# Patient Record
Sex: MALE | Race: WHITE | NOT HISPANIC OR LATINO | Employment: UNEMPLOYED | URBAN - METROPOLITAN AREA
[De-identification: names, ages, dates, MRNs, and addresses within clinical notes are randomized per-mention and may not be internally consistent; named-entity substitution may affect disease eponyms.]

---

## 2019-09-26 ENCOUNTER — OFFICE VISIT (OUTPATIENT)
Dept: PAIN MEDICINE | Facility: CLINIC | Age: 59
End: 2019-09-26
Payer: COMMERCIAL

## 2019-09-26 VITALS
WEIGHT: 160.25 LBS | HEIGHT: 68 IN | HEART RATE: 82 BPM | SYSTOLIC BLOOD PRESSURE: 129 MMHG | DIASTOLIC BLOOD PRESSURE: 83 MMHG | BODY MASS INDEX: 24.29 KG/M2

## 2019-09-26 DIAGNOSIS — M25.561 CHRONIC PAIN OF BOTH KNEES: ICD-10-CM

## 2019-09-26 DIAGNOSIS — M50.30 DDD (DEGENERATIVE DISC DISEASE), CERVICAL: ICD-10-CM

## 2019-09-26 DIAGNOSIS — M79.642 PAIN IN BOTH HANDS: ICD-10-CM

## 2019-09-26 DIAGNOSIS — M51.36 DDD (DEGENERATIVE DISC DISEASE), LUMBAR: Primary | ICD-10-CM

## 2019-09-26 DIAGNOSIS — M47.812 OSTEOARTHRITIS OF CERVICAL SPINE, UNSPECIFIED SPINAL OSTEOARTHRITIS COMPLICATION STATUS: ICD-10-CM

## 2019-09-26 DIAGNOSIS — G89.29 CHRONIC PAIN OF BOTH KNEES: ICD-10-CM

## 2019-09-26 DIAGNOSIS — M47.816 LUMBAR SPONDYLOSIS: ICD-10-CM

## 2019-09-26 DIAGNOSIS — M79.641 PAIN IN BOTH HANDS: ICD-10-CM

## 2019-09-26 DIAGNOSIS — M25.562 CHRONIC PAIN OF BOTH KNEES: ICD-10-CM

## 2019-09-26 PROBLEM — M51.369 DDD (DEGENERATIVE DISC DISEASE), LUMBAR: Status: ACTIVE | Noted: 2019-09-26

## 2019-09-26 PROCEDURE — 99244 PR OFFICE CONSULTATION,LEVEL IV: ICD-10-PCS | Mod: S$GLB,,, | Performed by: PAIN MEDICINE

## 2019-09-26 PROCEDURE — 99244 OFF/OP CNSLTJ NEW/EST MOD 40: CPT | Mod: S$GLB,,, | Performed by: PAIN MEDICINE

## 2019-09-26 PROCEDURE — 99999 PR PBB SHADOW E&M-NEW PATIENT-LVL III: CPT | Mod: PBBFAC,,, | Performed by: PAIN MEDICINE

## 2019-09-26 PROCEDURE — 99999 PR PBB SHADOW E&M-NEW PATIENT-LVL III: ICD-10-PCS | Mod: PBBFAC,,, | Performed by: PAIN MEDICINE

## 2019-09-26 RX ORDER — ASPIRIN 325 MG
50000 TABLET, DELAYED RELEASE (ENTERIC COATED) ORAL
Refills: 1 | COMMUNITY
Start: 2019-08-15

## 2019-09-26 RX ORDER — PRAVASTATIN SODIUM 10 MG/1
TABLET ORAL
COMMUNITY
End: 2019-10-24

## 2019-09-26 RX ORDER — AZELASTINE 1 MG/ML
SPRAY, METERED NASAL
COMMUNITY
End: 2020-02-03

## 2019-09-26 RX ORDER — GUAIFENESIN 1200 MG
TABLET, EXTENDED RELEASE 12 HR ORAL
COMMUNITY

## 2019-09-26 RX ORDER — FLUTICASONE PROPIONATE 50 MCG
SPRAY, SUSPENSION (ML) NASAL
Refills: 1 | COMMUNITY
Start: 2019-08-15 | End: 2021-09-15 | Stop reason: CLARIF

## 2019-09-26 RX ORDER — OMEPRAZOLE 40 MG/1
40 CAPSULE, DELAYED RELEASE ORAL DAILY
Refills: 0 | COMMUNITY
Start: 2019-08-19 | End: 2020-01-30

## 2019-09-26 RX ORDER — LISINOPRIL 2.5 MG/1
TABLET ORAL
COMMUNITY
End: 2020-02-03

## 2019-09-26 RX ORDER — HYDROCODONE BITARTRATE AND ACETAMINOPHEN 5; 325 MG/1; MG/1
TABLET ORAL
COMMUNITY
End: 2021-09-15 | Stop reason: CLARIF

## 2019-09-26 RX ORDER — ZOLPIDEM TARTRATE 5 MG/1
5 TABLET ORAL NIGHTLY
Refills: 1 | COMMUNITY
Start: 2019-08-15 | End: 2021-09-15 | Stop reason: CLARIF

## 2019-09-26 NOTE — PROGRESS NOTES
Subjective:     Patient ID: Nando Krause is a 59 y.o. male    Chief Complaint: Back Pain and Neck Pain      Referred by: Vicki Wilburn MD      HPI:    Initial Encounter (9/26/19):  Nando Krause is a 59 y.o. male who presents today with chronic neck and low back pain. This pain has been present for years.  No specific inciting event or injury noted. Patient has been treated in the past by Dr. Cristofer Barcenas and has undergone multiple interventional procedures for both his neck and low back.  He reports more success in treating his low back.  Patient states he has undergone epidurals and radiofrequency ablation of the cervical spine without any meaningful relief.  Patient states he has also been offered neck surgery by an orthopedic surgeon.  Today patient localizes his neck pain to the bilateral lower cervical regions.  The pain radiates the bilateral upper extremities with some associated numbness.  Patient denies any focal weakness or bowel bladder dysfunction.  The pain is constant worse with activity..   This pain is described in detail below.    Patient also reports having multiple peripheral joint pains and requests to see an orthopedic surgeon.    Physical Therapy:  Yes.    Non-pharmacologic Treatment:  Rest helps         · TENS?  No    Pain Medications:         · Currently taking:  Tylenol, Norco    · Has tried in the past:  NSAIDs    · Has not tried:  Muscle relaxants, TCAs, SNRIs, anticonvulsants, topical creams    Blood thinners:  None    Interventional Therapies:   Multiple interventional procedures done by Dr. Cristofer Barcenas.  No further details at this time    Relevant Surgeries:  None    Affecting sleep?  Yes    Affecting daily activities? yes    Depressive symptoms? no          · SI/HI? No    Work status: Disabled    Pain Scores:    Best:       4/10  Worst:     9/10  Usually:   5/10  Today:    4/10    Review of Systems   Constitutional: Negative for activity change, appetite change,  chills, fatigue, fever and unexpected weight change.   HENT: Negative for hearing loss.    Eyes: Negative for visual disturbance.   Respiratory: Negative for chest tightness and shortness of breath.    Cardiovascular: Negative for chest pain.   Gastrointestinal: Negative for abdominal pain, constipation, diarrhea, nausea and vomiting.   Genitourinary: Negative for difficulty urinating.   Musculoskeletal: Positive for arthralgias, back pain, gait problem, joint swelling, myalgias, neck pain and neck stiffness.   Skin: Negative for rash.   Neurological: Positive for numbness. Negative for dizziness, weakness, light-headedness and headaches.   Psychiatric/Behavioral: Positive for sleep disturbance. Negative for hallucinations and suicidal ideas. The patient is not nervous/anxious.        History reviewed. No pertinent past medical history.    History reviewed. No pertinent surgical history.    Social History     Socioeconomic History    Marital status:      Spouse name: Not on file    Number of children: Not on file    Years of education: Not on file    Highest education level: Not on file   Occupational History    Not on file   Social Needs    Financial resource strain: Not on file    Food insecurity:     Worry: Not on file     Inability: Not on file    Transportation needs:     Medical: Not on file     Non-medical: Not on file   Tobacco Use    Smoking status: Current Every Day Smoker   Substance and Sexual Activity    Alcohol use: Not on file    Drug use: Not on file    Sexual activity: Not on file   Lifestyle    Physical activity:     Days per week: Not on file     Minutes per session: Not on file    Stress: Not on file   Relationships    Social connections:     Talks on phone: Not on file     Gets together: Not on file     Attends Yarsanism service: Not on file     Active member of club or organization: Not on file     Attends meetings of clubs or organizations: Not on file     Relationship  "status: Not on file   Other Topics Concern    Not on file   Social History Narrative    Not on file       Review of patient's allergies indicates:  No Known Allergies    Current Outpatient Medications on File Prior to Visit   Medication Sig Dispense Refill    acetaminophen 325 mg Cap acetaminophen 325 mg capsule   Take 2 capsules every 4-6 hours by oral route.      azelastine (ASTELIN) 137 mcg (0.1 %) nasal spray azelastine 137 mcg (0.1 %) nasal spray aerosol   Spray 2 sprays twice a day by intranasal route.      cholecalciferol, vitamin D3, 50,000 unit capsule Take 50,000 Units by mouth every 7 days.  1    fluticasone propionate (FLONASE) 50 mcg/actuation nasal spray SPRAY 1 SPRAY INTO EACH NOSTRIL TWICE A DAY  1    HYDROcodone-acetaminophen (NORCO) 5-325 mg per tablet hydrocodone 5 mg-acetaminophen 325 mg tablet      lisinopril (PRINIVIL,ZESTRIL) 2.5 MG tablet lisinopril 2.5 mg tablet      omeprazole (PRILOSEC) 40 MG capsule Take 40 mg by mouth once daily.  0    pravastatin (PRAVACHOL) 10 MG tablet pravastatin 10 mg tablet      ranitidine (ZANTAC) 300 MG tablet Take 300 mg by mouth once daily.  1    zolpidem (AMBIEN) 5 MG Tab Take 5 mg by mouth every evening.  1     No current facility-administered medications on file prior to visit.        Objective:      /83 (BP Location: Left arm, Patient Position: Sitting, BP Method: Medium (Automatic))   Pulse 82   Ht 5' 8" (1.727 m)   Wt 72.7 kg (160 lb 4.4 oz)   BMI 24.37 kg/m²     Exam:  GEN:  Well developed, well nourished.  No acute distress.  Normal pain behavior.  HEENT:  No trauma.  Mucous membranes moist.  Nares patent bilaterally.  PSYCH: Normal affect. Thought content appropriate.  CHEST:  Breathing symmetric.  No audible wheezing.  ABD: Soft, non-distended.  SKIN:  Warm, pink, dry.  No rash on exposed areas.    EXT:  No cyanosis, clubbing, or edema.  No color change or changes in nail or hair growth.  NEURO/MUSCULOSKELETAL:  Fully alert, " oriented, and appropriate. Speech normal delmy. No cranial nerve deficits.   Gait:   normal.  5/5 motor strength throughout upper extremities.   Sensory:   no  sensory deficit in the upper extremities.   Reflexes:   2 + and symmetric throughout.   absent  Abraham's bilaterally.  C-Spine:   full  ROM with pain on  extension more than flexion.  positive  facet loading bilaterally.   negative  Spurling's bilaterally.    Positive  TTP over bilateral lower cervical paraspinal muscles          Imaging:  Outside cervical MRI from 4/26/19 reviewed today:    Disc protrusion at the C5-6 level causing severe narrowing of the right neural foramen and mild left foraminal stenosis  Moderate narrowing of the bilateral neural foramen at the C6-7 level  Multilevel facet arthropathy    Assessment:       Encounter Diagnoses   Name Primary?    DDD (degenerative disc disease), lumbar Yes    DDD (degenerative disc disease), cervical     Osteoarthritis of cervical spine, unspecified spinal osteoarthritis complication status     Lumbar spondylosis     Pain in both hands     Chronic pain of both knees          Plan:       Nando was seen today for back pain and neck pain.    Diagnoses and all orders for this visit:    DDD (degenerative disc disease), lumbar    DDD (degenerative disc disease), cervical    Osteoarthritis of cervical spine, unspecified spinal osteoarthritis complication status    Lumbar spondylosis    Pain in both hands  -     Ambulatory referral/consult to Orthopedics; Future    Chronic pain of both knees  -     Ambulatory referral/consult to Orthopedics; Future        Nando Krause is a 59 y.o. male with chronic neck and low back pain. Neck pain appears to be multifactorial in etiology.  Likely has components of both facet mediated pain as well as possible radiculopathy.  Also with chronic low back pain that is returning following interventional procedures done by another pain medicine physician.  Patient  also has multiple other peripheral joint pains..    1.  Pertinent imaging studies reviewed by me. Imaging results were discussed with patient.  2.  Release of information from Dr. Cristofer Barcenas for previously performed procedures.    3.  Refer to Orthopedics be evaluated for a source joint pains.  4.  Return to clinic in 2 weeks.  At that time we will outside records consider any appropriate interventional procedures versus physical therapy.

## 2019-09-26 NOTE — LETTER
September 26, 2019      Vicki Wilburn MD  [address]  [facility], [place] [address]           [facility]  [address], [facility] [address]  [facility] [address]  Phone: [phone]  Fax: [phone]          Patient: Nando Krause   MR Number: [id number]   YOB: 1960   Date of Visit: 9/26/2019       Dear Dr. Vicki Wilburn:    Thank you for referring Nando Krause to me for evaluation. Attached you will find relevant portions of my assessment and plan of care.    If you have questions, please do not hesitate to call me. I look forward to following Nando Krause along with you.    Sincerely,    Blair Burrell Jr., MD    Enclosure  CC:  No Recipients    If you would like to receive this communication electronically, please contact [email or web address] or [phone] to request more information on [employer] Link access.    For providers and/or their staff who would like to refer a patient to [facility], please contact us through our one-stop-shop provider referral line, [facility], at [phone].    If you feel you have received this communication in error or would no longer like to receive these types of communications, please e-mail [email or web address]

## 2019-10-24 ENCOUNTER — OFFICE VISIT (OUTPATIENT)
Dept: PAIN MEDICINE | Facility: CLINIC | Age: 59
End: 2019-10-24
Payer: COMMERCIAL

## 2019-10-24 VITALS
BODY MASS INDEX: 24.56 KG/M2 | DIASTOLIC BLOOD PRESSURE: 83 MMHG | SYSTOLIC BLOOD PRESSURE: 126 MMHG | HEART RATE: 74 BPM | HEIGHT: 68 IN | WEIGHT: 162.06 LBS

## 2019-10-24 DIAGNOSIS — M50.30 DDD (DEGENERATIVE DISC DISEASE), CERVICAL: ICD-10-CM

## 2019-10-24 DIAGNOSIS — M48.061 SPINAL STENOSIS OF LUMBAR REGION, UNSPECIFIED WHETHER NEUROGENIC CLAUDICATION PRESENT: ICD-10-CM

## 2019-10-24 DIAGNOSIS — M47.816 LUMBAR SPONDYLOSIS: ICD-10-CM

## 2019-10-24 DIAGNOSIS — M51.36 DDD (DEGENERATIVE DISC DISEASE), LUMBAR: ICD-10-CM

## 2019-10-24 DIAGNOSIS — M47.812 OSTEOARTHRITIS OF CERVICAL SPINE, UNSPECIFIED SPINAL OSTEOARTHRITIS COMPLICATION STATUS: Primary | ICD-10-CM

## 2019-10-24 PROCEDURE — 3008F PR BODY MASS INDEX (BMI) DOCUMENTED: ICD-10-PCS | Mod: CPTII,S$GLB,, | Performed by: PAIN MEDICINE

## 2019-10-24 PROCEDURE — 3008F BODY MASS INDEX DOCD: CPT | Mod: CPTII,S$GLB,, | Performed by: PAIN MEDICINE

## 2019-10-24 PROCEDURE — 99214 OFFICE O/P EST MOD 30 MIN: CPT | Mod: S$GLB,,, | Performed by: PAIN MEDICINE

## 2019-10-24 PROCEDURE — 99214 PR OFFICE/OUTPT VISIT, EST, LEVL IV, 30-39 MIN: ICD-10-PCS | Mod: S$GLB,,, | Performed by: PAIN MEDICINE

## 2019-10-24 PROCEDURE — 99999 PR PBB SHADOW E&M-EST. PATIENT-LVL III: CPT | Mod: PBBFAC,,, | Performed by: PAIN MEDICINE

## 2019-10-24 PROCEDURE — 99999 PR PBB SHADOW E&M-EST. PATIENT-LVL III: ICD-10-PCS | Mod: PBBFAC,,, | Performed by: PAIN MEDICINE

## 2019-10-24 RX ORDER — PRAVASTATIN SODIUM 40 MG/1
40 TABLET ORAL DAILY
Refills: 1 | COMMUNITY
Start: 2019-10-15 | End: 2021-09-15 | Stop reason: CLARIF

## 2019-10-24 NOTE — PROGRESS NOTES
Subjective:     Patient ID: Nando Krause is a 59 y.o. male    Chief Complaint: Back Pain (2 week f/u)      Referred by: No ref. provider found      HPI:    Interval History (10/24/19):  He returns today for follow up.  He reports that his pain is unchanged in quality location since last encounter.  He denies any new or worsening symptoms.  At his last visit, we planned to request records from his previous pain medicine physician to review previously performed interventional pain procedures.  No records have yet been received.  Patient states that he has undergone multiple epidural steroid injections for his neck as well as multiple radiofrequency ablation for his neck with little to no relief.  He states that he had 1 epidural done in his low back that provided 80% relief for roughly 7 months.  He is interested in attempting to treat his low back at this time.      Initial Encounter (9/26/19):  Nando Krause is a 59 y.o. male who presents today with chronic neck and low back pain. This pain has been present for years.  No specific inciting event or injury noted. Patient has been treated in the past by Dr. Cristofer Barecnas and has undergone multiple interventional procedures for both his neck and low back.  He reports more success in treating his low back.  Patient states he has undergone epidurals and radiofrequency ablation of the cervical spine without any meaningful relief.  Patient states he has also been offered neck surgery by an orthopedic surgeon.  Today patient localizes his neck pain to the bilateral lower cervical regions.  The pain radiates the bilateral upper extremities with some associated numbness.  Patient denies any focal weakness or bowel bladder dysfunction.  The pain is constant worse with activity..   This pain is described in detail below.    Patient also reports having multiple peripheral joint pains and requests to see an orthopedic surgeon.    Physical Therapy:   Yes.    Non-pharmacologic Treatment:  Rest helps         · TENS?  No    Pain Medications:         · Currently taking:  Tylenol, Norco    · Has tried in the past:  NSAIDs    · Has not tried:  Muscle relaxants, TCAs, SNRIs, anticonvulsants, topical creams    Blood thinners:  None    Interventional Therapies:   Multiple interventional procedures done by Dr. Cristofer Barcenas.  No further details at this time    Relevant Surgeries:  None    Affecting sleep?  Yes    Affecting daily activities? yes    Depressive symptoms? no          · SI/HI? No    Work status: Disabled    Pain Scores:    Best:       4/10  Worst:     9/10  Usually:   5/10  Today:    4/10    Review of Systems   Constitutional: Negative for activity change, appetite change, chills, fatigue, fever and unexpected weight change.   HENT: Negative for hearing loss.    Eyes: Negative for visual disturbance.   Respiratory: Negative for chest tightness and shortness of breath.    Cardiovascular: Negative for chest pain.   Gastrointestinal: Negative for abdominal pain, constipation, diarrhea, nausea and vomiting.   Genitourinary: Negative for difficulty urinating.   Musculoskeletal: Positive for arthralgias, back pain, gait problem, joint swelling, myalgias, neck pain and neck stiffness.   Skin: Negative for rash.   Neurological: Positive for weakness and numbness. Negative for dizziness, light-headedness and headaches.   Psychiatric/Behavioral: Positive for sleep disturbance. Negative for hallucinations and suicidal ideas. The patient is not nervous/anxious.        History reviewed. No pertinent past medical history.    History reviewed. No pertinent surgical history.    Social History     Socioeconomic History    Marital status:      Spouse name: Not on file    Number of children: Not on file    Years of education: Not on file    Highest education level: Not on file   Occupational History    Not on file   Social Needs    Financial resource strain: Not on  file    Food insecurity:     Worry: Not on file     Inability: Not on file    Transportation needs:     Medical: Not on file     Non-medical: Not on file   Tobacco Use    Smoking status: Current Every Day Smoker   Substance and Sexual Activity    Alcohol use: Not on file    Drug use: Not on file    Sexual activity: Not on file   Lifestyle    Physical activity:     Days per week: Not on file     Minutes per session: Not on file    Stress: Not on file   Relationships    Social connections:     Talks on phone: Not on file     Gets together: Not on file     Attends [employer] service: Not on file     Active member of club or organization: Not on file     Attends meetings of clubs or organizations: Not on file     Relationship status: Not on file   Other Topics Concern    Not on file   Social History Narrative    Not on file       Review of patient's allergies indicates:  No Known Allergies    Current Outpatient Medications on File Prior to Visit   Medication Sig Dispense Refill    acetaminophen 325 mg Cap acetaminophen 325 mg capsule   Take 2 capsules every 4-6 hours by oral route.      cholecalciferol, vitamin D3, 50,000 unit capsule Take 50,000 Units by mouth every 7 days.  1    fluticasone propionate (FLONASE) 50 mcg/actuation nasal spray SPRAY 1 SPRAY INTO EACH NOSTRIL TWICE A DAY  1    HYDROcodone-acetaminophen (NORCO) 5-325 mg per tablet hydrocodone 5 mg-acetaminophen 325 mg tablet      omeprazole (PRILOSEC) 40 MG capsule Take 40 mg by mouth once daily.  0    pravastatin (PRAVACHOL) 40 MG tablet Take 40 mg by mouth once daily.  1    ranitidine (ZANTAC) 300 MG tablet Take 300 mg by mouth once daily.  1    zolpidem (AMBIEN) 5 MG Tab Take 5 mg by mouth every evening.  1    azelastine (ASTELIN) 137 mcg (0.1 %) nasal spray azelastine 137 mcg (0.1 %) nasal spray aerosol   Spray 2 sprays twice a day by intranasal route.      lisinopril (PRINIVIL,ZESTRIL) 2.5 MG tablet lisinopril 2.5 mg tablet       "[DISCONTINUED] pravastatin (PRAVACHOL) 10 MG tablet pravastatin 10 mg tablet       No current facility-administered medications on file prior to visit.        Objective:      /83 (BP Location: Left arm, Patient Position: Sitting, BP Method: Medium (Automatic))   Pulse 74   Ht 5' 8" (1.727 m)   Wt 73.5 kg (162 lb 0.6 oz)   BMI 24.64 kg/m²     Exam:  GEN:  Well developed, well nourished.  No acute distress.  Normal pain behavior.  HEENT:  No trauma.  Mucous membranes moist.  Nares patent bilaterally.  PSYCH: Normal affect. Thought content appropriate.  CHEST:  Breathing symmetric.  No audible wheezing.  ABD: Soft, non-distended.  SKIN:  Warm, pink, dry.  No rash on exposed areas.    EXT:  No cyanosis, clubbing, or edema.  No color change or changes in nail or hair growth.  NEURO/MUSCULOSKELETAL:  Fully alert, oriented, and appropriate. Speech normal delmy. No cranial nerve deficits.   Gait:  Normal.  No trendelenburg sign bilaterally.   Motor Strength: 5/5 motor strength throughout lower extremities.   Sensory:  No sensory deficit in the lower extremities.   Reflexes:  2 + and symmetric throughout.  Downgoing Babinski's bilaterally.  No clonus or spasticity.  L-Spine:  Full ROM with pain on extension. Positive pain with axial/facet loading bilaterally.  Negative SLR bilaterally.    No TTP over lumbar paraspinals, bilateral SI joints, hips, piriformis muscles, or GTB.            Imaging:    Patient states that he underwent lumbar MRI within the past 2 years.  We do not have record of this MRI at this time.    Outside cervical MRI from 4/26/19 reviewed:    Disc protrusion at the C5-6 level causing severe narrowing of the right neural foramen and mild left foraminal stenosis  Moderate narrowing of the bilateral neural foramen at the C6-7 level  Multilevel facet arthropathy    Assessment:       Encounter Diagnoses   Name Primary?    Osteoarthritis of cervical spine, unspecified spinal osteoarthritis " complication status Yes    DDD (degenerative disc disease), cervical     DDD (degenerative disc disease), lumbar     Lumbar spondylosis     Spinal stenosis of lumbar region, unspecified whether neurogenic claudication present          Plan:       Nando was seen today for back pain.    Diagnoses and all orders for this visit:    Osteoarthritis of cervical spine, unspecified spinal osteoarthritis complication status    DDD (degenerative disc disease), cervical    DDD (degenerative disc disease), lumbar    Lumbar spondylosis    Spinal stenosis of lumbar region, unspecified whether neurogenic claudication present        Nando Krause is a 59 y.o. male with chronic neck and low back pain. As far as the patient's neck pain is concerned, it sounds as though all reasonable interventional procedures have been attempted without meaningful relief.  I am somewhat doubtful that I have any further options to treat this problem, but I will review outside records when available to make sure.  Patient's low back pain appears to be multifactorial.  He did get 80% relief for over 7 months from previous epidural done.  Also has indications of facet joint pain examination today.    1.  Re-request records from previous pain medicine physician to review previously performed interventional procedures.  2.  Patient was instructed to obtain a copy of his lumbar MRI disc and bring it to my clinic so that I can review the images prior to any interventional procedures.  3.  Schedule for lumbar epidural steroid injection. I plan to repeat the epidural done by his previous pain medicine physician given the degree and duration of relief that he received.  I will need to review outside records to see exactly which Level and approach were used.  4.  Return to clinic 2 weeks after procedure to discuss results.  May consider lumbar medial branch blocks/radiofrequency ablation in the future if appropriate.

## 2019-11-06 ENCOUNTER — TELEPHONE (OUTPATIENT)
Dept: PAIN MEDICINE | Facility: CLINIC | Age: 59
End: 2019-11-06

## 2019-11-06 NOTE — TELEPHONE ENCOUNTER
----- Message from Ghada Scott sent at 11/6/2019  9:02 AM CST -----  Contact: pt  Reason: Pt returning call from [facility]    Communication: [phone]

## 2019-11-21 ENCOUNTER — OFFICE VISIT (OUTPATIENT)
Dept: ORTHOPEDICS | Facility: CLINIC | Age: 59
End: 2019-11-21
Payer: COMMERCIAL

## 2019-11-21 ENCOUNTER — TELEPHONE (OUTPATIENT)
Dept: ORTHOPEDICS | Facility: CLINIC | Age: 59
End: 2019-11-21

## 2019-11-21 VITALS
HEART RATE: 69 BPM | DIASTOLIC BLOOD PRESSURE: 93 MMHG | HEIGHT: 68 IN | BODY MASS INDEX: 24.39 KG/M2 | SYSTOLIC BLOOD PRESSURE: 134 MMHG | WEIGHT: 160.94 LBS

## 2019-11-21 DIAGNOSIS — M79.642 PAIN IN BOTH HANDS: ICD-10-CM

## 2019-11-21 DIAGNOSIS — M79.641 PAIN IN BOTH HANDS: ICD-10-CM

## 2019-11-21 DIAGNOSIS — R52 PAIN: ICD-10-CM

## 2019-11-21 DIAGNOSIS — M25.561 CHRONIC PAIN OF BOTH KNEES: ICD-10-CM

## 2019-11-21 DIAGNOSIS — M54.50 LOW BACK PAIN, UNSPECIFIED BACK PAIN LATERALITY, UNSPECIFIED CHRONICITY, UNSPECIFIED WHETHER SCIATICA PRESENT: Primary | ICD-10-CM

## 2019-11-21 DIAGNOSIS — M25.561 PAIN IN BOTH KNEES, UNSPECIFIED CHRONICITY: ICD-10-CM

## 2019-11-21 DIAGNOSIS — G89.29 CHRONIC PAIN OF BOTH KNEES: ICD-10-CM

## 2019-11-21 DIAGNOSIS — M79.641 PAIN OF RIGHT HAND: Primary | ICD-10-CM

## 2019-11-21 DIAGNOSIS — M25.562 PAIN IN BOTH KNEES, UNSPECIFIED CHRONICITY: ICD-10-CM

## 2019-11-21 DIAGNOSIS — M25.562 CHRONIC PAIN OF BOTH KNEES: ICD-10-CM

## 2019-11-21 PROCEDURE — 99999 PR PBB SHADOW E&M-EST. PATIENT-LVL III: ICD-10-PCS | Mod: PBBFAC,,, | Performed by: ORTHOPAEDIC SURGERY

## 2019-11-21 PROCEDURE — 99999 PR PBB SHADOW E&M-EST. PATIENT-LVL I: CPT | Mod: PBBFAC,,, | Performed by: ORTHOPAEDIC SURGERY

## 2019-11-21 PROCEDURE — 99999 PR PBB SHADOW E&M-EST. PATIENT-LVL III: CPT | Mod: PBBFAC,,, | Performed by: ORTHOPAEDIC SURGERY

## 2019-11-21 PROCEDURE — 99204 OFFICE O/P NEW MOD 45 MIN: CPT | Mod: S$GLB,,, | Performed by: ORTHOPAEDIC SURGERY

## 2019-11-21 PROCEDURE — 99499 UNLISTED E&M SERVICE: CPT | Mod: S$GLB,,, | Performed by: ORTHOPAEDIC SURGERY

## 2019-11-21 PROCEDURE — 3008F BODY MASS INDEX DOCD: CPT | Mod: CPTII,S$GLB,, | Performed by: ORTHOPAEDIC SURGERY

## 2019-11-21 PROCEDURE — 99999 PR PBB SHADOW E&M-EST. PATIENT-LVL I: ICD-10-PCS | Mod: PBBFAC,,, | Performed by: ORTHOPAEDIC SURGERY

## 2019-11-21 PROCEDURE — 99204 PR OFFICE/OUTPT VISIT, NEW, LEVL IV, 45-59 MIN: ICD-10-PCS | Mod: S$GLB,,, | Performed by: ORTHOPAEDIC SURGERY

## 2019-11-21 PROCEDURE — 3008F PR BODY MASS INDEX (BMI) DOCUMENTED: ICD-10-PCS | Mod: CPTII,S$GLB,, | Performed by: ORTHOPAEDIC SURGERY

## 2019-11-21 PROCEDURE — 99499 NO LOS: ICD-10-PCS | Mod: S$GLB,,, | Performed by: ORTHOPAEDIC SURGERY

## 2019-11-21 RX ORDER — TRAMADOL HYDROCHLORIDE 50 MG/1
50 TABLET ORAL NIGHTLY PRN
Qty: 31 TABLET | Refills: 2 | Status: SHIPPED | OUTPATIENT
Start: 2019-11-21 | End: 2019-12-01

## 2019-11-21 RX ORDER — MELOXICAM 15 MG/1
15 TABLET ORAL DAILY
Qty: 30 TABLET | Refills: 2 | Status: SHIPPED | OUTPATIENT
Start: 2019-11-21 | End: 2020-09-02

## 2019-11-21 RX ORDER — METHYLPREDNISOLONE 4 MG/1
TABLET ORAL
Qty: 1 PACKAGE | Refills: 0 | Status: SHIPPED | OUTPATIENT
Start: 2019-11-21 | End: 2020-01-30 | Stop reason: ALTCHOICE

## 2019-11-21 NOTE — PROGRESS NOTES
Orthopaedic Surgery History and Physical     History of present illness:   Nando Krause is a 59 y.o. male who presents with subjective bilateral knee pain. No mechanical symptoms. No radiculopathy.  Patient has concomitant lumbar pathology from which he reports radiating pain emanating from his lower spine down the posterior aspect of his thighs and down to his feet.    Allergies:   Review of patient's allergies indicates:  No Known Allergies    Past medical history:   No past medical history on file.    Past surgical history:  No past surgical history on file.    Social history:   Reviewed per EPIC history for tobacco or alcohol use     Medications:    Current Outpatient Medications:     cholecalciferol, vitamin D3, 50,000 unit capsule, Take 50,000 Units by mouth every 7 days., Disp: , Rfl: 1    fluticasone propionate (FLONASE) 50 mcg/actuation nasal spray, SPRAY 1 SPRAY INTO EACH NOSTRIL TWICE A DAY, Disp: , Rfl: 1    omeprazole (PRILOSEC) 40 MG capsule, Take 40 mg by mouth once daily., Disp: , Rfl: 0    pravastatin (PRAVACHOL) 40 MG tablet, Take 40 mg by mouth once daily., Disp: , Rfl: 1    zolpidem (AMBIEN) 5 MG Tab, Take 5 mg by mouth every evening., Disp: , Rfl: 1    acetaminophen 325 mg Cap, acetaminophen 325 mg capsule  Take 2 capsules every 4-6 hours by oral route., Disp: , Rfl:     azelastine (ASTELIN) 137 mcg (0.1 %) nasal spray, azelastine 137 mcg (0.1 %) nasal spray aerosol  Spray 2 sprays twice a day by intranasal route., Disp: , Rfl:     HYDROcodone-acetaminophen (NORCO) 5-325 mg per tablet, hydrocodone 5 mg-acetaminophen 325 mg tablet, Disp: , Rfl:     lisinopril (PRINIVIL,ZESTRIL) 2.5 MG tablet, lisinopril 2.5 mg tablet, Disp: , Rfl:     ranitidine (ZANTAC) 300 MG tablet, Take 300 mg by mouth once daily., Disp: , Rfl: 1    Review of systems:  Denies chest pain, palpitations, shortness of breath.   Denies excessive thirst, urination or heat or cold intolerance.   Denies nausea,  "vomiting, melena or hematochezia.    Denies fever, chills, night sweats, weight loss.    Denies dysuria or hematuria.   Denies history of anxiety or depression.   Denies any skin abnormalities or rash.   Denies upper or lower extremity paresthesias or lightheadedness.    Denies cough, shortness of breath or hemoptysis.     Physical Exam:   Vitals:    11/21/19 1119   BP: (!) 134/93   BP Location: Left arm   Patient Position: Sitting   BP Method: Medium (Automatic)   Pulse: 69   Weight: 73 kg (160 lb 15 oz)   Height: 5' 8" (1.727 m)     Awake/alert/oriented x3, No acute distress, Afebrile, Vital signs stable  Normocephalic, Atraumatic  Heart is beating at normal rate  Good inspiratory effort with unlaboured breathing  Abdomen soft/nondistended/nontender    Bilateral lower extremity  Motor intact L2-S1  Sensation intact L2-S2  2+ popliteal/dorsalis pedis/posterior tibial pulses  <2s CR refill to digits  0-130 degrees range of motion bilateral knees      Imaging:  Radiographs of the bilateral knees demonstrates minimal tricompartmental degeneration with no deformity.    Assessment:   59 y.o. male with lumbar radiculopathy    Plan:   Continue follow-up with Pain Management  Medrol Dosepak  NSAIDs  Tramadol  Activity as tolerated  Return to clinic p.grecia.    Nando Rob MD  [facility]  "

## 2019-11-21 NOTE — LETTER
December 5, 2019      Blair Burrell Jr., MD  [address]  [address]  [place] [address]           [facility]  Orthopedics  [address], [facility] [address]  [facility] [place] [address]  Phone: [phone]  Fax: [phone]          Patient: Nando Krause   MR Number: [id number]   YOB: 1960   Date of Visit: 11/21/2019       Dear Dr. Blair Burrell Jr.:    Thank you for referring Nando Krause to me for evaluation. Attached you will find relevant portions of my assessment and plan of care.    If you have questions, please do not hesitate to call me. I look forward to following Nando Krause along with you.    Sincerely,    Harini Castillo  CC:  No Recipients    If you would like to receive this communication electronically, please contact [email or web address] or [phone] to request more information on [employer] Link access.    For providers and/or their staff who would like to refer a patient to [facility], please contact us through our one-stop-shop provider referral line, [facility], at [phone].    If you feel you have received this communication in error or would no longer like to receive these types of communications, please e-mail [email or web address]

## 2019-12-19 PROBLEM — R52 PAIN: Status: ACTIVE | Noted: 2019-12-19

## 2020-01-28 ENCOUNTER — TELEPHONE (OUTPATIENT)
Dept: PAIN MEDICINE | Facility: CLINIC | Age: 60
End: 2020-01-28

## 2020-01-28 NOTE — TELEPHONE ENCOUNTER
----- Message from Rizwana Degroot sent at 1/28/2020 11:19 AM CST -----  Contact: PT [phone]  Pt is trying to speak with Lazarus please call back

## 2020-01-28 NOTE — TELEPHONE ENCOUNTER
Spoke with patient about making an appointment to come to the clinic to discuss scheduling future procedures. Appointment has been set for 1/30/2020. No further issues discussed.

## 2020-01-28 NOTE — TELEPHONE ENCOUNTER
Left callback message. Discuss making an appointment with Dr Burrell to review plans for a series of procedures (MBB and RFA).

## 2020-01-30 ENCOUNTER — OFFICE VISIT (OUTPATIENT)
Dept: PAIN MEDICINE | Facility: CLINIC | Age: 60
End: 2020-01-30
Payer: COMMERCIAL

## 2020-01-30 VITALS
HEIGHT: 68 IN | WEIGHT: 165.88 LBS | SYSTOLIC BLOOD PRESSURE: 127 MMHG | HEART RATE: 77 BPM | DIASTOLIC BLOOD PRESSURE: 81 MMHG | BODY MASS INDEX: 25.14 KG/M2

## 2020-01-30 DIAGNOSIS — M47.816 LUMBAR SPONDYLOSIS: ICD-10-CM

## 2020-01-30 DIAGNOSIS — M54.16 LUMBAR RADICULOPATHY: ICD-10-CM

## 2020-01-30 DIAGNOSIS — M51.36 DDD (DEGENERATIVE DISC DISEASE), LUMBAR: Primary | ICD-10-CM

## 2020-01-30 PROCEDURE — 99499 NO LOS: ICD-10-PCS | Mod: S$GLB,,, | Performed by: PAIN MEDICINE

## 2020-01-30 PROCEDURE — 99499 UNLISTED E&M SERVICE: CPT | Mod: S$GLB,,, | Performed by: PAIN MEDICINE

## 2020-01-30 PROCEDURE — 99999 PR PBB SHADOW E&M-EST. PATIENT-LVL III: ICD-10-PCS | Mod: PBBFAC,,, | Performed by: PAIN MEDICINE

## 2020-01-30 PROCEDURE — 99999 PR PBB SHADOW E&M-EST. PATIENT-LVL III: CPT | Mod: PBBFAC,,, | Performed by: PAIN MEDICINE

## 2020-01-30 RX ORDER — TRAMADOL HYDROCHLORIDE 50 MG/1
TABLET ORAL
COMMUNITY
Start: 2019-12-12 | End: 2021-09-15 | Stop reason: CLARIF

## 2020-01-30 RX ORDER — FAMOTIDINE 40 MG/1
40 TABLET, FILM COATED ORAL DAILY
COMMUNITY
Start: 2020-01-23

## 2020-01-30 NOTE — PROGRESS NOTES
Subjective:     Patient ID: Nando Krause is a 59 y.o. male    Chief Complaint: Back Pain      Referred by: No ref. provider found      HPI:    Interval History (1/30/20):  He returns today for follow up.  He states that he has been unable to acquire records from previous pain management physician. We have also not received any records. He reports that his pain is unchanged in quality or location since last encounter. He is interested in moving forward with interventional procedures despite not having outside records.       Interval History (10/24/19):  He returns today for follow up.  He reports that his pain is unchanged in quality location since last encounter.  He denies any new or worsening symptoms.  At his last visit, we planned to request records from his previous pain medicine physician to review previously performed interventional pain procedures.  No records have yet been received.  Patient states that he has undergone multiple epidural steroid injections for his neck as well as multiple radiofrequency ablation for his neck with little to no relief.  He states that he had 1 epidural done in his low back that provided 80% relief for roughly 7 months.  He is interested in attempting to treat his low back at this time.      Initial Encounter (9/26/19):  Nando Krause is a 59 y.o. male who presents today with chronic neck and low back pain. This pain has been present for years.  No specific inciting event or injury noted. Patient has been treated in the past by Dr. Cristofer Barcenas and has undergone multiple interventional procedures for both his neck and low back.  He reports more success in treating his low back.  Patient states he has undergone epidurals and radiofrequency ablation of the cervical spine without any meaningful relief.  Patient states he has also been offered neck surgery by an orthopedic surgeon.  Today patient localizes his neck pain to the bilateral lower cervical regions.   The pain radiates the bilateral upper extremities with some associated numbness.  Patient denies any focal weakness or bowel bladder dysfunction.  The pain is constant worse with activity..   This pain is described in detail below.    Patient also reports having multiple peripheral joint pains and requests to see an orthopedic surgeon.    Physical Therapy:  Yes.    Non-pharmacologic Treatment:  Rest helps         · TENS?  No    Pain Medications:         · Currently taking:  Tylenol, Norco    · Has tried in the past:  NSAIDs    · Has not tried:  Muscle relaxants, TCAs, SNRIs, anticonvulsants, topical creams    Blood thinners:  None    Interventional Therapies:   Multiple interventional procedures done by Dr. Cristofer Barcenas.  No further details at this time    Relevant Surgeries:  None    Affecting sleep?  Yes    Affecting daily activities? yes    Depressive symptoms? no          · SI/HI? No    Work status: Disabled    Pain Scores:    Best:       4/10  Worst:     9/10  Usually:   5/10  Today:    4/10    Review of Systems   Constitutional: Negative for activity change, appetite change, chills, fatigue, fever and unexpected weight change.   HENT: Negative for hearing loss.    Eyes: Negative for visual disturbance.   Respiratory: Negative for chest tightness and shortness of breath.    Cardiovascular: Negative for chest pain.   Gastrointestinal: Negative for abdominal pain, constipation, diarrhea, nausea and vomiting.   Genitourinary: Negative for difficulty urinating.   Musculoskeletal: Positive for arthralgias, back pain, gait problem, joint swelling, myalgias, neck pain and neck stiffness.   Skin: Negative for rash.   Neurological: Positive for weakness and numbness. Negative for dizziness, light-headedness and headaches.   Psychiatric/Behavioral: Positive for sleep disturbance. Negative for hallucinations and suicidal ideas. The patient is not nervous/anxious.        History reviewed. No pertinent past medical  history.    History reviewed. No pertinent surgical history.    Social History     Socioeconomic History    Marital status:      Spouse name: Not on file    Number of children: Not on file    Years of education: Not on file    Highest education level: Not on file   Occupational History    Not on file   Social Needs    Financial resource strain: Not on file    Food insecurity:     Worry: Not on file     Inability: Not on file    Transportation needs:     Medical: Not on file     Non-medical: Not on file   Tobacco Use    Smoking status: Current Every Day Smoker   Substance and Sexual Activity    Alcohol use: Not on file    Drug use: Not on file    Sexual activity: Not on file   Lifestyle    Physical activity:     Days per week: Not on file     Minutes per session: Not on file    Stress: Not on file   Relationships    Social connections:     Talks on phone: Not on file     Gets together: Not on file     Attends Jewish service: Not on file     Active member of club or organization: Not on file     Attends meetings of clubs or organizations: Not on file     Relationship status: Not on file   Other Topics Concern    Not on file   Social History Narrative    Not on file       Review of patient's allergies indicates:  No Known Allergies    Current Outpatient Medications on File Prior to Visit   Medication Sig Dispense Refill    acetaminophen 325 mg Cap acetaminophen 325 mg capsule   Take 2 capsules every 4-6 hours by oral route.      cholecalciferol, vitamin D3, 50,000 unit capsule Take 50,000 Units by mouth every 7 days.  1    famotidine (PEPCID) 40 MG tablet       fluticasone propionate (FLONASE) 50 mcg/actuation nasal spray SPRAY 1 SPRAY INTO EACH NOSTRIL TWICE A DAY  1    meloxicam (MOBIC) 15 MG tablet Take 1 tablet (15 mg total) by mouth once daily. Do not take with any other NSAIDs  Take with a meal 30 tablet 2    pravastatin (PRAVACHOL) 40 MG tablet Take 40 mg by mouth once daily.  1  "   traMADol (ULTRAM) 50 mg tablet       zolpidem (AMBIEN) 5 MG Tab Take 5 mg by mouth every evening.  1    azelastine (ASTELIN) 137 mcg (0.1 %) nasal spray azelastine 137 mcg (0.1 %) nasal spray aerosol   Spray 2 sprays twice a day by intranasal route.      HYDROcodone-acetaminophen (NORCO) 5-325 mg per tablet hydrocodone 5 mg-acetaminophen 325 mg tablet      lisinopril (PRINIVIL,ZESTRIL) 2.5 MG tablet lisinopril 2.5 mg tablet      [DISCONTINUED] methylPREDNISolone (MEDROL DOSEPACK) 4 mg tablet use as directed 1 Package 0    [DISCONTINUED] omeprazole (PRILOSEC) 40 MG capsule Take 40 mg by mouth once daily.  0    [DISCONTINUED] ranitidine (ZANTAC) 300 MG tablet Take 300 mg by mouth once daily.  1     No current facility-administered medications on file prior to visit.        Objective:      /81 (BP Location: Left arm, Patient Position: Sitting, BP Method: Medium (Automatic))   Pulse 77   Ht 5' 8" (1.727 m)   Wt 75.3 kg (165 lb 14.3 oz)   BMI 25.22 kg/m²     Exam:  GEN:  Well developed, well nourished.  No acute distress.   HEENT:  No trauma.  Mucous membranes moist.  Nares patent bilaterally.  PSYCH: Normal affect. Thought content appropriate.  CHEST:  Breathing symmetric.  No audible wheezing.  ABD: Soft, non-distended.  SKIN:  Warm, pink, dry.  No rash on exposed areas.    EXT:  No cyanosis, clubbing, or edema.  No color change or changes in nail or hair growth.  NEURO/MUSCULOSKELETAL:  Fully alert, oriented, and appropriate. Speech normal delmy. No cranial nerve deficits.   Gait: Normal.  No focal motor deficits.           Imaging:    Outside lumbar MRI reviewed today. DDD throughtou lumbar spine. Some foraminal stenosis noted at the bilateral l4-5 foramen. Facet arthropathy noted as well.     Outside cervical MRI from 4/26/19 reviewed:    Disc protrusion at the C5-6 level causing severe narrowing of the right neural foramen and mild left foraminal stenosis  Moderate narrowing of the bilateral " neural foramen at the C6-7 level  Multilevel facet arthropathy    Assessment:       Encounter Diagnoses   Name Primary?    DDD (degenerative disc disease), lumbar Yes    Lumbar spondylosis     Lumbar radiculopathy          Plan:       Nando was seen today for back pain.    Diagnoses and all orders for this visit:    DDD (degenerative disc disease), lumbar    Lumbar spondylosis    Lumbar radiculopathy        Nando Krause is a 59 y.o. male with chronic neck and low back pain. As far as the patient's neck pain is concerned, it sounds as though all reasonable interventional procedures have been attempted without meaningful relief.  I am somewhat doubtful that I have any further options to treat this problem. It may be reasonable to try another cervical JOSH in the future.   Patient's low back pain appears to be multifactorial.  He did get 80% relief for over 7 months from previous epidural done. No records available specifying the exact location or approach of the JOSH.  Also has indications of facet joint pain examination today.    1.  Pertinent imaging studies reviewed by me. Imaging results were discussed with patient.  2.  Schedule for bilateral L5 transforaminal epidural steroid injection.  3.  Return to clinic 2 weeks after procedure to discuss results.  May consider repeat cervical epidural steroid injection versus lumbar medial branch blocks in the future.

## 2020-02-05 ENCOUNTER — TELEPHONE (OUTPATIENT)
Dept: PAIN MEDICINE | Facility: CLINIC | Age: 60
End: 2020-02-05

## 2020-02-05 NOTE — TELEPHONE ENCOUNTER
Spoke with patient about his procedure tomorrow with Dr Burrell. He stated that he would have to cancel his procedure due to the cost of the procedure being over $2000 out of pocket after the insurance paid their part. Patient is aware that he can contact this office in the future to reschedule this procedure. No further issues discussed. Procedure has been removed from the schedule.

## 2020-02-05 NOTE — TELEPHONE ENCOUNTER
----- Message from Marce Greer sent at 2/5/2020  9:05 AM CST -----  Contact: patient  [phone]-please call above patient at number in message need to cancel surgery for tomorrow waiting on a call from the nurse thanks.

## 2020-09-01 ENCOUNTER — TELEPHONE (OUTPATIENT)
Dept: ORTHOPEDICS | Facility: CLINIC | Age: 60
End: 2020-09-01

## 2020-09-01 DIAGNOSIS — M25.561 RIGHT KNEE PAIN, UNSPECIFIED CHRONICITY: Primary | ICD-10-CM

## 2020-09-01 NOTE — TELEPHONE ENCOUNTER
Spoke with pt. Pt aware that he will need xrays prior to appt to see Dr Goyal. Xray scheduled. Pt verbalized understanding.

## 2020-09-02 ENCOUNTER — OFFICE VISIT (OUTPATIENT)
Dept: ORTHOPEDICS | Facility: CLINIC | Age: 60
End: 2020-09-02
Payer: COMMERCIAL

## 2020-09-02 VITALS
SYSTOLIC BLOOD PRESSURE: 133 MMHG | WEIGHT: 162.5 LBS | DIASTOLIC BLOOD PRESSURE: 82 MMHG | HEIGHT: 68 IN | HEART RATE: 50 BPM | BODY MASS INDEX: 24.63 KG/M2

## 2020-09-02 DIAGNOSIS — S83.241A TEAR OF MEDIAL MENISCUS OF RIGHT KNEE, CURRENT, UNSPECIFIED TEAR TYPE, INITIAL ENCOUNTER: Primary | ICD-10-CM

## 2020-09-02 PROCEDURE — 99999 PR PBB SHADOW E&M-EST. PATIENT-LVL IV: CPT | Mod: PBBFAC,,, | Performed by: ORTHOPAEDIC SURGERY

## 2020-09-02 PROCEDURE — 3008F PR BODY MASS INDEX (BMI) DOCUMENTED: ICD-10-PCS | Mod: CPTII,S$GLB,, | Performed by: ORTHOPAEDIC SURGERY

## 2020-09-02 PROCEDURE — 99213 OFFICE O/P EST LOW 20 MIN: CPT | Mod: S$GLB,,, | Performed by: ORTHOPAEDIC SURGERY

## 2020-09-02 PROCEDURE — 99999 PR PBB SHADOW E&M-EST. PATIENT-LVL IV: ICD-10-PCS | Mod: PBBFAC,,, | Performed by: ORTHOPAEDIC SURGERY

## 2020-09-02 PROCEDURE — 99213 PR OFFICE/OUTPT VISIT, EST, LEVL III, 20-29 MIN: ICD-10-PCS | Mod: S$GLB,,, | Performed by: ORTHOPAEDIC SURGERY

## 2020-09-02 PROCEDURE — 3008F BODY MASS INDEX DOCD: CPT | Mod: CPTII,S$GLB,, | Performed by: ORTHOPAEDIC SURGERY

## 2020-09-02 RX ORDER — LATANOPROST 50 UG/ML
1 SOLUTION/ DROPS OPHTHALMIC NIGHTLY
COMMUNITY
Start: 2020-06-28

## 2020-09-02 RX ORDER — DICLOFENAC SODIUM 75 MG/1
75 TABLET, DELAYED RELEASE ORAL 2 TIMES DAILY
Qty: 60 TABLET | Refills: 1 | Status: SHIPPED | OUTPATIENT
Start: 2020-09-02 | End: 2021-09-15 | Stop reason: CLARIF

## 2020-09-02 RX ORDER — OMEPRAZOLE 40 MG/1
40 CAPSULE, DELAYED RELEASE ORAL DAILY
COMMUNITY
Start: 2020-06-09

## 2020-09-02 NOTE — LETTER
September 2, 2020      Byron Rob MD  [address] [facility] [facility]  [address]  [place] [address]           [facility]  Orthopedics  [address]  [facility] [place] [address]  Phone: [phone]  Fax: [phone]          Patient: Nando Krause   MR Number: [id number]   YOB: 1960   Date of Visit: 9/2/2020       Dear Dr. Byron Rob:    Thank you for referring Nando Krause to me for evaluation. Attached you will find relevant portions of my assessment and plan of care.    If you have questions, please do not hesitate to call me. I look forward to following Nando Krause along with you.    Sincerely,    Edy Goyal MD    Enclosure  CC:  No Recipients    If you would like to receive this communication electronically, please contact [email or web address] or [phone] to request more information on [employer] Link access.    For providers and/or their staff who would like to refer a patient to [facility], please contact us through our one-stop-shop provider referral line, [facility], at [phone].    If you feel you have received this communication in error or would no longer like to receive these types of communications, please e-mail [email or web address]

## 2020-09-02 NOTE — PROGRESS NOTES
Subjective:    Patient ID:  Nando Krause is a 60 y.o. y.o. male who presents for initial visit for Pain of the Right Knee      59 yo male reports that he injured his right knee 5 days ago when he stepped off a ladder.  He complains of medial pain, sharp/stabbing and aggravated with walking, bending, twisting or turning.  He endorses swelling and intermittent buckling sensation.  Denies locking, giving way, night or rest pain.  He has been taking Mobic with minimal relief.          Past Medical History:   Diagnosis Date    Back pain         Past Surgical History:   Procedure Laterality Date    cyst remoal Right 2017       Review of patient's allergies indicates:  No Known Allergies       Current Outpatient Medications:     acetaminophen 325 mg Cap, acetaminophen 325 mg capsule  Take 2 capsules every 4-6 hours by oral route., Disp: , Rfl:     cholecalciferol, vitamin D3, 50,000 unit capsule, Take 50,000 Units by mouth every 7 days., Disp: , Rfl: 1    famotidine (PEPCID) 40 MG tablet, , Disp: , Rfl:     fluticasone propionate (FLONASE) 50 mcg/actuation nasal spray, SPRAY 1 SPRAY INTO EACH NOSTRIL TWICE A DAY, Disp: , Rfl: 1    HYDROcodone-acetaminophen (NORCO) 5-325 mg per tablet, hydrocodone 5 mg-acetaminophen 325 mg tablet, Disp: , Rfl:     latanoprost 0.005 % ophthalmic solution, Place 1 drop into both eyes nightly., Disp: , Rfl:     meloxicam (MOBIC) 15 MG tablet, Take 1 tablet (15 mg total) by mouth once daily. Do not take with any other NSAIDs Take with a meal, Disp: 30 tablet, Rfl: 2    omeprazole (PRILOSEC) 40 MG capsule, Take 40 mg by mouth once daily., Disp: , Rfl:     pravastatin (PRAVACHOL) 40 MG tablet, Take 40 mg by mouth once daily., Disp: , Rfl: 1    traMADol (ULTRAM) 50 mg tablet, , Disp: , Rfl:     zolpidem (AMBIEN) 5 MG Tab, Take 5 mg by mouth every evening., Disp: , Rfl: 1    Social History     Socioeconomic History    Marital status:      Spouse name: Not on file     "Number of children: Not on file    Years of education: Not on file    Highest education level: Not on file   Occupational History    Not on file   Social Needs    Financial resource strain: Not on file    Food insecurity     Worry: Not on file     Inability: Not on file    Transportation needs     Medical: Not on file     Non-medical: Not on file   Tobacco Use    Smoking status: Current Every Day Smoker     Packs/day: 0.25   Substance and Sexual Activity    Alcohol use: Yes     Comment: socially    Drug use: Never    Sexual activity: Not on file   Lifestyle    Physical activity     Days per week: Not on file     Minutes per session: Not on file    Stress: Not on file   Relationships    Social connections     Talks on phone: Not on file     Gets together: Not on file     Attends Christianity service: Not on file     Active member of club or organization: Not on file     Attends meetings of clubs or organizations: Not on file     Relationship status: Not on file   Other Topics Concern    Not on file   Social History Narrative    Not on file        History reviewed. No pertinent family history.     Review of Systems   Constitutional: Negative for chills and fever.   HENT: Negative for hearing loss.    Eyes: Negative for blurred vision.   Respiratory: Negative for shortness of breath.    Cardiovascular: Negative for chest pain.   Gastrointestinal: Negative for nausea and vomiting.   Genitourinary: Negative for dysuria.   Musculoskeletal: Negative for myalgias.   Skin: Negative for rash.   Neurological: Negative for speech change and loss of consciousness.   Endo/Heme/Allergies: Does not bruise/bleed easily.   Psychiatric/Behavioral: Negative for depression.        Objective:     /82   Pulse (!) 50   Ht 5' 8" (1.727 m)   Wt 73.7 kg (162 lb 7.7 oz)   BMI 24.70 kg/m²     Ortho Exam     59 yo male in NAD; alert, oriented x 3    BLE: N/V intact; no edema    Right knee: trace effusion; tender MJL; FROM; " no gross ligamentous laxity; positive Charity's    Imaging:     X-rays standing AP bilateral knee, lateral and sunrise right knee taken today are independently reviewed by me and are unremarkable.      Assessment & Plan:      1. Tear of medial meniscus of right knee, current, unspecified tear type, initial encounter       1.  Findings, diagnosis, treatment options/risks/benefits were reviewed  2.  Voltaren 75 mg po bid (discontinue Mobic)  3.  PT  4.  Maintain judicious activity modification/limitation  5.  Follow-up in 6 weeks

## 2021-08-27 DIAGNOSIS — R07.89 OTHER CHEST PAIN: Primary | ICD-10-CM

## 2021-09-15 ENCOUNTER — HOSPITAL ENCOUNTER (OUTPATIENT)
Dept: PREADMISSION TESTING | Facility: HOSPITAL | Age: 61
Discharge: HOME | End: 2021-09-15
Attending: INTERNAL MEDICINE
Payer: COMMERCIAL

## 2021-09-15 ENCOUNTER — HOSPITAL ENCOUNTER (OUTPATIENT)
Dept: RADIOLOGY | Facility: HOSPITAL | Age: 61
Discharge: HOME | End: 2021-09-15
Attending: INTERNAL MEDICINE
Payer: COMMERCIAL

## 2021-09-15 DIAGNOSIS — Z01.810 PRE-PROCEDURAL CARDIOVASCULAR EXAMINATION: Primary | ICD-10-CM

## 2021-09-15 DIAGNOSIS — R07.89 OTHER CHEST PAIN: ICD-10-CM

## 2021-09-15 LAB
ALBUMIN SERPL BCP-MCNC: 4.6 G/DL (ref 3.5–5.2)
ALP SERPL-CCNC: 38 U/L (ref 55–135)
ALT SERPL W/O P-5'-P-CCNC: 36 U/L (ref 10–44)
ANION GAP SERPL CALC-SCNC: 10 MMOL/L (ref 8–16)
APTT PPP: 28.6 SEC (ref 25.6–35.8)
AST SERPL-CCNC: 29 U/L (ref 10–40)
BILIRUB SERPL-MCNC: 0.9 MG/DL (ref 0.1–1)
BUN SERPL-MCNC: 16 MG/DL (ref 6–20)
CALCIUM SERPL-MCNC: 9.7 MG/DL (ref 8.7–10.5)
CHLORIDE SERPL-SCNC: 105 MMOL/L (ref 95–110)
CO2 SERPL-SCNC: 25 MMOL/L (ref 23–29)
CREAT SERPL-MCNC: 0.7 MG/DL (ref 0.5–1.4)
ERYTHROCYTE [DISTWIDTH] IN BLOOD BY AUTOMATED COUNT: 13 % (ref 11.5–14.5)
EST. GFR  (AFRICAN AMERICAN): >60 ML/MIN/1.73 M^2
EST. GFR  (NON AFRICAN AMERICAN): >60 ML/MIN/1.73 M^2
GLUCOSE SERPL-MCNC: 88 MG/DL (ref 70–110)
HCT VFR BLD AUTO: 47.5 % (ref 40–54)
HGB BLD-MCNC: 16.1 G/DL (ref 14–18)
INR PPP: 1
MAGNESIUM SERPL-MCNC: 2.1 MG/DL (ref 1.6–2.6)
MCH RBC QN AUTO: 31.4 PG (ref 27–31)
MCHC RBC AUTO-ENTMCNC: 33.9 G/DL (ref 32–36)
MCV RBC AUTO: 93 FL (ref 82–98)
PLATELET # BLD AUTO: 242 K/UL (ref 150–450)
PMV BLD AUTO: 8.6 FL (ref 9.2–12.9)
POTASSIUM SERPL-SCNC: 4.3 MMOL/L (ref 3.5–5.1)
PROT SERPL-MCNC: 7.8 G/DL (ref 6–8.4)
PROTHROMBIN TIME: 12.6 SEC (ref 11.8–14.3)
RBC # BLD AUTO: 5.12 M/UL (ref 4.6–6.2)
SODIUM SERPL-SCNC: 140 MMOL/L (ref 136–145)
WBC # BLD AUTO: 5.78 K/UL (ref 3.9–12.7)

## 2021-09-15 PROCEDURE — 85610 PROTHROMBIN TIME: CPT | Performed by: INTERNAL MEDICINE

## 2021-09-15 PROCEDURE — 36415 COLL VENOUS BLD VENIPUNCTURE: CPT | Performed by: INTERNAL MEDICINE

## 2021-09-15 PROCEDURE — 93010 ELECTROCARDIOGRAM REPORT: CPT | Mod: ,,, | Performed by: SPECIALIST

## 2021-09-15 PROCEDURE — 93010 EKG 12-LEAD: ICD-10-PCS | Mod: ,,, | Performed by: SPECIALIST

## 2021-09-15 PROCEDURE — 80053 COMPREHEN METABOLIC PANEL: CPT | Performed by: INTERNAL MEDICINE

## 2021-09-15 PROCEDURE — 83735 ASSAY OF MAGNESIUM: CPT | Performed by: INTERNAL MEDICINE

## 2021-09-15 PROCEDURE — 85027 COMPLETE CBC AUTOMATED: CPT | Performed by: INTERNAL MEDICINE

## 2021-09-15 PROCEDURE — 71046 X-RAY EXAM CHEST 2 VIEWS: CPT | Mod: TC

## 2021-09-15 PROCEDURE — 85730 THROMBOPLASTIN TIME PARTIAL: CPT | Performed by: INTERNAL MEDICINE

## 2021-09-15 PROCEDURE — 93005 ELECTROCARDIOGRAM TRACING: CPT | Performed by: SPECIALIST

## 2021-09-15 RX ORDER — ASPIRIN 81 MG/1
81 TABLET ORAL DAILY
COMMUNITY

## 2021-09-15 RX ORDER — ATORVASTATIN CALCIUM 40 MG/1
40 TABLET, FILM COATED ORAL DAILY
COMMUNITY

## 2021-09-17 ENCOUNTER — HOSPITAL ENCOUNTER (OUTPATIENT)
Facility: HOSPITAL | Age: 61
Discharge: HOME | End: 2021-09-17
Attending: INTERNAL MEDICINE | Admitting: INTERNAL MEDICINE
Payer: COMMERCIAL

## 2021-09-17 DIAGNOSIS — R07.9 CHEST PAIN, UNSPECIFIED TYPE: ICD-10-CM

## 2021-09-17 LAB
CATH EF ESTIMATED: 60 %
SARS-COV-2 RDRP RESP QL NAA+PROBE: NEGATIVE

## 2021-09-17 PROCEDURE — C1887 CATHETER, GUIDING: HCPCS | Performed by: INTERNAL MEDICINE

## 2021-09-17 PROCEDURE — 93572 IV DOP VEL&/PRESS C FLO EA: CPT

## 2021-09-17 PROCEDURE — U0002 COVID-19 LAB TEST NON-CDC: HCPCS | Performed by: INTERNAL MEDICINE

## 2021-09-17 PROCEDURE — C1894 INTRO/SHEATH, NON-LASER: HCPCS | Performed by: INTERNAL MEDICINE

## 2021-09-17 PROCEDURE — 99153 MOD SED SAME PHYS/QHP EA: CPT | Performed by: INTERNAL MEDICINE

## 2021-09-17 PROCEDURE — 93571 IV DOP VEL&/PRESS C FLO 1ST: CPT

## 2021-09-17 PROCEDURE — 25000003 PHARM REV CODE 250: Performed by: INTERNAL MEDICINE

## 2021-09-17 PROCEDURE — 25500020 PHARM REV CODE 255: Performed by: INTERNAL MEDICINE

## 2021-09-17 PROCEDURE — 63600175 PHARM REV CODE 636 W HCPCS: Performed by: INTERNAL MEDICINE

## 2021-09-17 PROCEDURE — C1769 GUIDE WIRE: HCPCS | Performed by: INTERNAL MEDICINE

## 2021-09-17 PROCEDURE — 93458 L HRT ARTERY/VENTRICLE ANGIO: CPT

## 2021-09-17 PROCEDURE — 99152 MOD SED SAME PHYS/QHP 5/>YRS: CPT | Performed by: INTERNAL MEDICINE

## 2021-09-17 RX ORDER — NITROGLYCERIN 5 MG/ML
INJECTION, SOLUTION INTRAVENOUS
Status: DISCONTINUED | OUTPATIENT
Start: 2021-09-17 | End: 2021-09-17 | Stop reason: HOSPADM

## 2021-09-17 RX ORDER — FENTANYL CITRATE 50 UG/ML
INJECTION, SOLUTION INTRAMUSCULAR; INTRAVENOUS
Status: DISCONTINUED | OUTPATIENT
Start: 2021-09-17 | End: 2021-09-17 | Stop reason: HOSPADM

## 2021-09-17 RX ORDER — VERAPAMIL HYDROCHLORIDE 2.5 MG/ML
INJECTION, SOLUTION INTRAVENOUS
Status: DISCONTINUED | OUTPATIENT
Start: 2021-09-17 | End: 2021-09-17 | Stop reason: HOSPADM

## 2021-09-17 RX ORDER — SODIUM CHLORIDE 9 MG/ML
INJECTION, SOLUTION INTRAVENOUS ONCE
Status: COMPLETED | OUTPATIENT
Start: 2021-09-17 | End: 2021-09-17

## 2021-09-17 RX ORDER — HEPARIN SODIUM 10000 [USP'U]/ML
INJECTION, SOLUTION INTRAVENOUS; SUBCUTANEOUS
Status: DISCONTINUED | OUTPATIENT
Start: 2021-09-17 | End: 2021-09-17 | Stop reason: HOSPADM

## 2021-09-17 RX ORDER — SODIUM CHLORIDE 9 MG/ML
75 INJECTION, SOLUTION INTRAVENOUS CONTINUOUS
Status: DISCONTINUED | OUTPATIENT
Start: 2021-09-17 | End: 2021-09-17 | Stop reason: HOSPADM

## 2021-09-17 RX ORDER — MIDAZOLAM HYDROCHLORIDE 1 MG/ML
INJECTION INTRAMUSCULAR; INTRAVENOUS
Status: DISCONTINUED | OUTPATIENT
Start: 2021-09-17 | End: 2021-09-17 | Stop reason: HOSPADM

## 2021-09-17 RX ORDER — LIDOCAINE HYDROCHLORIDE 10 MG/ML
INJECTION, SOLUTION EPIDURAL; INFILTRATION; INTRACAUDAL; PERINEURAL
Status: DISCONTINUED | OUTPATIENT
Start: 2021-09-17 | End: 2021-09-17 | Stop reason: HOSPADM

## 2021-09-17 RX ADMIN — SODIUM CHLORIDE: 0.9 INJECTION, SOLUTION INTRAVENOUS at 06:09

## 2021-09-20 VITALS
HEART RATE: 55 BPM | SYSTOLIC BLOOD PRESSURE: 121 MMHG | RESPIRATION RATE: 13 BRPM | OXYGEN SATURATION: 99 % | DIASTOLIC BLOOD PRESSURE: 77 MMHG

## (undated) DEVICE — GUIDEWIRE J TIP EXCHANGE FIXED CORE 260

## (undated) DEVICE — SHEATH INTRODUCER SLENDER 6FX10CM

## (undated) DEVICE — CATHETER RADIAL TIG 4.0 OPTITORQUE 5X110

## (undated) DEVICE — Device

## (undated) DEVICE — HEMOSTAT VASC BAND REG 24CM

## (undated) DEVICE — CATHETER GUIDE LAUNCHER EBU3.5 6X110